# Patient Record
Sex: FEMALE | Race: WHITE | NOT HISPANIC OR LATINO | URBAN - METROPOLITAN AREA
[De-identification: names, ages, dates, MRNs, and addresses within clinical notes are randomized per-mention and may not be internally consistent; named-entity substitution may affect disease eponyms.]

---

## 2020-05-22 NOTE — PATIENT DISCUSSION
DRY EYE SYNDROME OU: RX ARTIFICIAL TEARS AS NEEDED TO INCREASE COMFORT OU- GAVE SAMPLE OF REFRESH RELIEVA AND REFRESH LIQUIGEL FOR USE QHS. GAVE SAMPLE OF ALREX FOR USE BID UNTIL OUT. IF SYMPTOMS PERSIST CONSIDER PUNCTAL PLUGS OR RESTASIS.

## 2020-05-22 NOTE — PATIENT DISCUSSION
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 SEND BACK TO [facility] FOR RECHECK.

## 2022-09-13 ENCOUNTER — ESTABLISHED PATIENT (OUTPATIENT)
Dept: URBAN - METROPOLITAN AREA CLINIC 21 | Facility: CLINIC | Age: 67
End: 2022-09-13

## 2022-09-13 DIAGNOSIS — H25.813: ICD-10-CM

## 2022-09-13 PROCEDURE — 92014 COMPRE OPH EXAM EST PT 1/>: CPT

## 2022-09-13 ASSESSMENT — TONOMETRY
OD_IOP_MMHG: 18
OS_IOP_MMHG: 18

## 2022-09-13 ASSESSMENT — VISUAL ACUITY
OU_CC: J1+
OD_SC: 20/40-1
OS_SC: 20/40+2
OU_SC: 20/30-2

## 2022-09-27 ENCOUNTER — CONSULTATION/EVALUATION (OUTPATIENT)
Dept: URBAN - METROPOLITAN AREA CLINIC 21 | Facility: CLINIC | Age: 67
End: 2022-09-27

## 2022-09-27 DIAGNOSIS — H43.813: ICD-10-CM

## 2022-09-27 DIAGNOSIS — H25.813: ICD-10-CM

## 2022-09-27 PROCEDURE — 92136 OPHTHALMIC BIOMETRY: CPT

## 2022-09-27 PROCEDURE — 92014 COMPRE OPH EXAM EST PT 1/>: CPT

## 2022-09-27 PROCEDURE — 92134 CPTRZ OPH DX IMG PST SGM RTA: CPT

## 2022-09-27 ASSESSMENT — VISUAL ACUITY
OU_SC: J5
OU_SC: 20/40
OD_SC: 20/50-2
OS_SC: 20/40

## 2022-09-27 ASSESSMENT — KERATOMETRY
OS_K2POWER_DIOPTERS: 46.25
OS_K1POWER_DIOPTERS: 45.25
OD_AXISANGLE2_DEGREES: 103
OS_AXISANGLE2_DEGREES: 104
OD_K2POWER_DIOPTERS: 45.50
OD_K1POWER_DIOPTERS: 44.75
OS_AXISANGLE_DEGREES: 14
OD_AXISANGLE_DEGREES: 13

## 2022-09-27 ASSESSMENT — TONOMETRY
OS_IOP_MMHG: 19
OD_IOP_MMHG: 20

## 2022-11-17 ENCOUNTER — POST-OP (OUTPATIENT)
Dept: URBAN - METROPOLITAN AREA CLINIC 20 | Facility: CLINIC | Age: 67
End: 2022-11-17

## 2022-11-17 DIAGNOSIS — Z96.1: ICD-10-CM

## 2022-11-17 PROCEDURE — 99024 POSTOP FOLLOW-UP VISIT: CPT

## 2022-11-17 ASSESSMENT — TONOMETRY
OS_IOP_MMHG: 17
OD_IOP_MMHG: 11

## 2022-11-17 ASSESSMENT — VISUAL ACUITY
OU_SC: J1
OS_SC: 20/40+2
OD_SC: 20/20

## 2022-12-01 ENCOUNTER — POST-OP (OUTPATIENT)
Dept: URBAN - METROPOLITAN AREA CLINIC 20 | Facility: CLINIC | Age: 67
End: 2022-12-01

## 2022-12-01 PROCEDURE — 99024 POSTOP FOLLOW-UP VISIT: CPT

## 2022-12-01 ASSESSMENT — TONOMETRY
OD_IOP_MMHG: 15
OS_IOP_MMHG: 12

## 2022-12-01 ASSESSMENT — VISUAL ACUITY
OS_SC: 20/20
OU_SC: 20/20
OD_SC: 20/20

## 2022-12-13 ENCOUNTER — POST-OP (OUTPATIENT)
Dept: URBAN - METROPOLITAN AREA CLINIC 21 | Facility: CLINIC | Age: 67
End: 2022-12-13

## 2022-12-13 PROCEDURE — 99024 POSTOP FOLLOW-UP VISIT: CPT

## 2022-12-13 ASSESSMENT — KERATOMETRY
OD_AXISANGLE2_DEGREES: 108
OS_K1POWER_DIOPTERS: 45.25
OD_AXISANGLE_DEGREES: 18
OS_AXISANGLE_DEGREES: 178
OD_K2POWER_DIOPTERS: 45.50
OD_K1POWER_DIOPTERS: 45.00
OS_AXISANGLE2_DEGREES: 88
OS_K2POWER_DIOPTERS: 46.00

## 2022-12-13 ASSESSMENT — TONOMETRY
OS_IOP_MMHG: 16
OD_IOP_MMHG: 15

## 2022-12-13 ASSESSMENT — VISUAL ACUITY
OU_SC: 20/20-1
OS_CC: J1-1
OS_SC: 20/20-1
OD_SC: 20/15
OU_CC: J1-2

## 2023-01-04 ENCOUNTER — POST-OP (OUTPATIENT)
Dept: URBAN - METROPOLITAN AREA CLINIC 21 | Facility: CLINIC | Age: 68
End: 2023-01-04

## 2023-01-04 DIAGNOSIS — Z96.1: ICD-10-CM

## 2023-01-04 DIAGNOSIS — H52.223: ICD-10-CM

## 2023-01-04 PROCEDURE — 99024 POSTOP FOLLOW-UP VISIT: CPT

## 2023-01-04 PROCEDURE — 92015 DETERMINE REFRACTIVE STATE: CPT

## 2023-01-04 ASSESSMENT — KERATOMETRY
OD_K1POWER_DIOPTERS: 45.00
OS_K2POWER_DIOPTERS: 46.00
OS_AXISANGLE_DEGREES: 178
OS_K1POWER_DIOPTERS: 45.25
OD_AXISANGLE_DEGREES: 18
OS_AXISANGLE2_DEGREES: 88
OD_AXISANGLE2_DEGREES: 108
OD_K2POWER_DIOPTERS: 45.50

## 2023-01-04 ASSESSMENT — TONOMETRY
OD_IOP_MMHG: 13
OS_IOP_MMHG: 14

## 2023-01-04 ASSESSMENT — VISUAL ACUITY
OD_SC: 20/20
OS_SC: 20/20-2

## 2025-04-29 ENCOUNTER — FOLLOW UP (OUTPATIENT)
Age: 70
End: 2025-04-29

## 2025-04-29 DIAGNOSIS — H26.493: ICD-10-CM

## 2025-04-29 DIAGNOSIS — H35.363: ICD-10-CM

## 2025-04-29 PROCEDURE — 99214 OFFICE O/P EST MOD 30 MIN: CPT

## 2025-04-29 PROCEDURE — 92134 CPTRZ OPH DX IMG PST SGM RTA: CPT
